# Patient Record
Sex: MALE | Race: OTHER | Employment: OTHER | URBAN - METROPOLITAN AREA
[De-identification: names, ages, dates, MRNs, and addresses within clinical notes are randomized per-mention and may not be internally consistent; named-entity substitution may affect disease eponyms.]

---

## 2022-08-16 ENCOUNTER — HOSPITAL ENCOUNTER (OUTPATIENT)
Dept: LAB | Age: 72
Discharge: HOME | End: 2022-08-16

## 2022-08-16 LAB — XX-LABCORP SPECIMEN COL,LCBCF: NORMAL

## 2022-08-16 PROCEDURE — 99001 SPECIMEN HANDLING PT-LAB: CPT

## 2022-09-21 ENCOUNTER — OFFICE VISIT (OUTPATIENT)
Dept: INTERNAL MEDICINE CLINIC | Age: 72
End: 2022-09-21
Payer: COMMERCIAL

## 2022-09-21 VITALS
TEMPERATURE: 97 F | BODY MASS INDEX: 26.09 KG/M2 | SYSTOLIC BLOOD PRESSURE: 106 MMHG | HEART RATE: 65 BPM | OXYGEN SATURATION: 98 % | DIASTOLIC BLOOD PRESSURE: 67 MMHG | RESPIRATION RATE: 18 BRPM | WEIGHT: 166.2 LBS | HEIGHT: 67 IN

## 2022-09-21 DIAGNOSIS — E11.69 TYPE 2 DIABETES MELLITUS WITH OTHER SPECIFIED COMPLICATION, WITH LONG-TERM CURRENT USE OF INSULIN (HCC): ICD-10-CM

## 2022-09-21 DIAGNOSIS — F22 PARANOIA (PSYCHOSIS) (HCC): ICD-10-CM

## 2022-09-21 DIAGNOSIS — Z76.89 ESTABLISHING CARE WITH NEW DOCTOR, ENCOUNTER FOR: Primary | ICD-10-CM

## 2022-09-21 DIAGNOSIS — E78.5 HYPERLIPIDEMIA, UNSPECIFIED HYPERLIPIDEMIA TYPE: ICD-10-CM

## 2022-09-21 DIAGNOSIS — I63.9 ISCHEMIC STROKE (HCC): ICD-10-CM

## 2022-09-21 DIAGNOSIS — I10 ESSENTIAL HYPERTENSION: ICD-10-CM

## 2022-09-21 DIAGNOSIS — Z79.4 TYPE 2 DIABETES MELLITUS WITH OTHER SPECIFIED COMPLICATION, WITH LONG-TERM CURRENT USE OF INSULIN (HCC): ICD-10-CM

## 2022-09-21 PROBLEM — E78.00 HYPERCHOLESTEREMIA: Status: ACTIVE | Noted: 2022-09-21

## 2022-09-21 PROBLEM — Z87.19 HISTORY OF GI BLEED: Status: ACTIVE | Noted: 2022-09-21

## 2022-09-21 PROBLEM — H26.9 CATARACTS, BOTH EYES: Status: ACTIVE | Noted: 2022-09-21

## 2022-09-21 PROBLEM — N20.0 KIDNEY STONES: Status: ACTIVE | Noted: 2022-09-21

## 2022-09-21 PROCEDURE — 99204 OFFICE O/P NEW MOD 45 MIN: CPT | Performed by: INTERNAL MEDICINE

## 2022-09-21 PROCEDURE — 1123F ACP DISCUSS/DSCN MKR DOCD: CPT | Performed by: INTERNAL MEDICINE

## 2022-09-21 RX ORDER — AMLODIPINE BESYLATE 5 MG/1
5 TABLET ORAL DAILY
Qty: 30 TABLET | Refills: 2 | Status: SHIPPED | OUTPATIENT
Start: 2022-09-21

## 2022-09-21 RX ORDER — RISPERIDONE 1 MG/1
TABLET, FILM COATED ORAL DAILY
COMMUNITY

## 2022-09-21 RX ORDER — ATORVASTATIN CALCIUM 20 MG/1
20 TABLET, FILM COATED ORAL DAILY
Qty: 30 TABLET | Refills: 2 | Status: SHIPPED | OUTPATIENT
Start: 2022-09-21

## 2022-09-21 RX ORDER — INSULIN GLARGINE 100 [IU]/ML
INJECTION, SOLUTION SUBCUTANEOUS
COMMUNITY
Start: 2022-09-19

## 2022-09-21 RX ORDER — ATORVASTATIN CALCIUM 10 MG/1
TABLET, FILM COATED ORAL DAILY
COMMUNITY
End: 2022-09-21 | Stop reason: SDUPTHER

## 2022-09-21 RX ORDER — AMLODIPINE BESYLATE 5 MG/1
5 TABLET ORAL DAILY
COMMUNITY
Start: 2022-09-19 | End: 2022-09-21 | Stop reason: SDUPTHER

## 2022-09-21 RX ORDER — METFORMIN HYDROCHLORIDE 500 MG/1
TABLET ORAL
COMMUNITY
Start: 2022-09-19

## 2022-09-21 NOTE — PROGRESS NOTES
HISTORY OF PRESENT ILLNESS  Matias Rowley is a 71 y.o. male. Patient comes to establish PCP. Patient comes in company of daughter who is an RN. Has no major complaints this morning. He will need refills for medications. Ischemic stroke  On atorvastatin. Pressure under control. Diabetes mellitus type 2  On metformin, Lantus, Januvia. Patient does not require refills for now. Hypertension -controlled  On amlodipine, well controlled. Hyperlipidemia  On atorvastatin. PAST MEDICAL HISTORY  Medical: Stroke 2018, diabetes type 2, hypertension, hyperlipidemia. Surgical: GI bleeding while patient was on aspirin status post clip. Allergy: denied. Medication: denied. Work: retired. Sexual: , 4 children. Social: mother breast cancer, father with liver disease, siblings with breast cancer. Establish Care  Pertinent negatives include no chest pain. Hypertension   Pertinent negatives include no chest pain and no palpitations. Diabetes  Pertinent negatives include no chest pain. Cholesterol Problem  Pertinent negatives include no chest pain. Review of Systems   Cardiovascular:  Negative for chest pain, palpitations and leg swelling. Visit Vitals  /67 (BP 1 Location: Right arm, BP Patient Position: Sitting, BP Cuff Size: Adult)   Pulse 65   Temp 97 °F (36.1 °C) (Temporal)   Resp 18   Ht 5' 7\" (1.702 m)   Wt 166 lb 3.2 oz (75.4 kg)   SpO2 98%   BMI 26.03 kg/m²     Physical Exam  Constitutional:       Appearance: Normal appearance. HENT:      Mouth/Throat:      Mouth: Mucous membranes are moist.      Pharynx: Oropharynx is clear. Eyes:      Extraocular Movements: Extraocular movements intact. Conjunctiva/sclera: Conjunctivae normal.      Pupils: Pupils are equal, round, and reactive to light. Cardiovascular:      Rate and Rhythm: Normal rate and regular rhythm. Pulses: Normal pulses. Heart sounds: Normal heart sounds.    Pulmonary:      Effort: Pulmonary effort is normal.      Breath sounds: Normal breath sounds. Abdominal:      General: Bowel sounds are normal.      Palpations: Abdomen is soft. Musculoskeletal:      Cervical back: Normal range of motion. Lymphadenopathy:      Cervical: No cervical adenopathy. Skin:     General: Skin is warm. Neurological:      Mental Status: He is alert and oriented to person, place, and time. Psychiatric:         Mood and Affect: Mood normal.       ASSESSMENT and PLAN    ICD-10-CM ICD-9-CM    1. Establishing care with new doctor, encounter for  Z76.89 V65.8       2. Essential hypertension  I10 401.9 amLODIPine (NORVASC) 5 mg tablet      METABOLIC PANEL, COMPREHENSIVE      METABOLIC PANEL, COMPREHENSIVE      3. Ischemic stroke ([facility])  I63.9 434.91 atorvastatin (LIPITOR) 20 mg tablet      LIPID PANEL      LIPID PANEL      4. Paranoia (psychosis) ([facility])  F22 297.1 REFERRAL TO PSYCHIATRY      5. Hyperlipidemia, unspecified hyperlipidemia type  E78.5 272.4 atorvastatin (LIPITOR) 20 mg tablet      LIPID PANEL      LIPID PANEL      6. Type 2 diabetes mellitus with other specified complication, with long-term current use of insulin ([facility])  E11.69 250.80 HEMOGLOBIN A1C WITH EAG    Z79.4 V58.67 HEMOGLOBIN A1C WITH EAG        Follow-up and Dispositions    Return for FU for . Robert Anthony Patient comes to establish PCP. Patient's daughter is concerned about the necessity of continue risperidone for paranoia which was started in 2020 after events of paranoia after COVID infection and be admitted to the ICU. Apparently he was started on risperidone while hospitalized. Referral to psychiatry. No new neurological deficits, patient does not have evidence neurological focalization on exam.  Atorvastatin dose has been increased from 10 mg to 20 mg. Follow-up in 3 months for health maintenance.

## 2022-09-21 NOTE — PROGRESS NOTES
ROOM # 10  Identified pt with two pt identifiers(name and ). Reviewed record in preparation for visit and have obtained necessary documentation. Chief Complaint   Patient presents with    Establish Care     Saw NP at [facility]. Hypertension    Diabetes    Cholesterol Problem      Dexter Bennett preferred language for health care discussion is English/Ukrainian. Is the patient using any DME equipment during OV? NO    Dexter Bennett is due for:  Health Maintenance Due   Topic    Hepatitis C Screening     Depression Screen     COVID-19 Vaccine (1)    Lipid Screen     DTaP/Tdap/Td series (1 - Tdap)    Colorectal Cancer Screening Combo     Shingrix Vaccine Age 50> (1 of 2)    Pneumococcal 65+ years (1 - PCV)    Flu Vaccine (1)     Health Maintenance reviewed and discussed per provider  Please order/place referral if appropriate. 1. For patients aged 39-70: Has the patient had a colonoscopy? No     Advance Directive:  1. Do you have an advance directive in place? Patient Reply: Not asked    2. If not, would you like material regarding how to put one in place? Not asked    Coordination of Care:  1. Have you been to the ER, urgent care clinic since your last visit? Hospitalized since your last visit? Yes-for medication refill of insulin    2. Have you seen or consulted any other health care providers outside of the [address] [place] [address] since your last visit? Include any pap smears or colon screening. NO    Patient is accompanied by daughter I have received verbal consent from Dexter Bennett to discuss any/all medical information while they are present in the room. Learning Assessment:  No flowsheet data found. Depression Screening:  3 most recent PHQ Screens 2022   Little interest or pleasure in doing things Not at all Not at all   Feeling down, depressed, irritable, or hopeless Not at all Not at all   Total Score PHQ 2 0 0     Abuse Screening:  No flowsheet data found.   Fall Risk  Fall Risk Assessment, last 12 mths 9/21/2022   Able to walk? Yes   Fall in past 12 months? 1   Do you feel unsteady? 0   Are you worried about falling 0   Number of falls in past 12 months 1   Fall with injury?  0     Recent Travel Screening and Travel History documentation     Travel Screening     No screening recorded since 09/20/22 0000       Travel History   Travel since 08/21/22    No documented travel since 08/21/22

## 2022-09-22 DIAGNOSIS — Z79.4 TYPE 2 DIABETES MELLITUS WITH OTHER SPECIFIED COMPLICATION, WITH LONG-TERM CURRENT USE OF INSULIN (HCC): Primary | ICD-10-CM

## 2022-09-22 DIAGNOSIS — E11.69 TYPE 2 DIABETES MELLITUS WITH OTHER SPECIFIED COMPLICATION, WITH LONG-TERM CURRENT USE OF INSULIN (HCC): Primary | ICD-10-CM

## 2022-09-22 LAB
ALBUMIN SERPL-MCNC: 4.4 G/DL (ref 3.7–4.7)
ALBUMIN/GLOB SERPL: 1.7 {RATIO} (ref 1.2–2.2)
ALP SERPL-CCNC: 96 IU/L (ref 44–121)
ALT SERPL-CCNC: 18 IU/L (ref 0–44)
AST SERPL-CCNC: 13 IU/L (ref 0–40)
BILIRUB SERPL-MCNC: 0.3 MG/DL (ref 0–1.2)
BUN SERPL-MCNC: 26 MG/DL (ref 8–27)
BUN/CREAT SERPL: 21 (ref 10–24)
CALCIUM SERPL-MCNC: 10 MG/DL (ref 8.6–10.2)
CHLORIDE SERPL-SCNC: 100 MMOL/L (ref 96–106)
CHOLEST SERPL-MCNC: 180 MG/DL (ref 100–199)
CO2 SERPL-SCNC: 21 MMOL/L (ref 20–29)
CREAT SERPL-MCNC: 1.21 MG/DL (ref 0.76–1.27)
EGFR: 64 ML/MIN/1.73
EST. AVERAGE GLUCOSE BLD GHB EST-MCNC: 237 MG/DL
GLOBULIN SER CALC-MCNC: 2.6 G/DL (ref 1.5–4.5)
GLUCOSE SERPL-MCNC: 229 MG/DL (ref 65–99)
HBA1C MFR BLD: 9.9 % (ref 4.8–5.6)
HDLC SERPL-MCNC: 37 MG/DL
IMP & REVIEW OF LAB RESULTS: NORMAL
LDLC SERPL CALC-MCNC: 101 MG/DL (ref 0–99)
POTASSIUM SERPL-SCNC: 4.5 MMOL/L (ref 3.5–5.2)
PROT SERPL-MCNC: 7 G/DL (ref 6–8.5)
SODIUM SERPL-SCNC: 138 MMOL/L (ref 134–144)
TRIGL SERPL-MCNC: 247 MG/DL (ref 0–149)
VLDLC SERPL CALC-MCNC: 42 MG/DL (ref 5–40)

## 2022-09-22 RX ORDER — MAGNESIUM SULFATE 100 %
15 CRYSTALS MISCELLANEOUS AS NEEDED
Qty: 15 TABLET | Refills: 1 | Status: SHIPPED | OUTPATIENT
Start: 2022-09-22

## 2022-09-22 NOTE — PROGRESS NOTES
Patient was called, patient and daughter were informed about blood work, insulin required increase from 35 units to 40 units nightly. Patient was not using Januvia or metformin for about a week. Now he has refills. Daughter will come in a week to evaluate glucose readings 3 times a day before meals and we will readjust accordingly. We will check A1c in 3 months.

## 2022-09-28 DIAGNOSIS — E11.69 TYPE 2 DIABETES MELLITUS WITH OTHER SPECIFIED COMPLICATION, WITH LONG-TERM CURRENT USE OF INSULIN (HCC): Primary | ICD-10-CM

## 2022-09-28 DIAGNOSIS — Z79.4 TYPE 2 DIABETES MELLITUS WITH OTHER SPECIFIED COMPLICATION, WITH LONG-TERM CURRENT USE OF INSULIN (HCC): Primary | ICD-10-CM

## 2022-09-28 RX ORDER — PEN NEEDLE, DIABETIC 31 GX3/16"
NEEDLE, DISPOSABLE MISCELLANEOUS
Qty: 100 PEN NEEDLE | Refills: 1 | Status: SHIPPED | OUTPATIENT
Start: 2022-09-28

## 2022-10-17 ENCOUNTER — TELEPHONE (OUTPATIENT)
Dept: INTERNAL MEDICINE CLINIC | Age: 72
End: 2022-10-17

## 2022-10-17 ENCOUNTER — DOCUMENTATION ONLY (OUTPATIENT)
Dept: INTERNAL MEDICINE CLINIC | Age: 72
End: 2022-10-17

## 2022-10-17 NOTE — TELEPHONE ENCOUNTER
Noted that patient has been seen message in where I answered to their concerns. Says that he has been having hallucinations, and he is out of risperidone and per patient's daughter since that risperidone was not working. I would like to place an appointment with behavioral health clinic so we can address further management. I also asked her to schedule a video appointment with me and left a message with instructions. Hopefully they will reach back some. No

## 2022-12-19 ENCOUNTER — OFFICE VISIT (OUTPATIENT)
Dept: INTERNAL MEDICINE CLINIC | Age: 72
End: 2022-12-19
Payer: COMMERCIAL

## 2022-12-19 VITALS
OXYGEN SATURATION: 100 % | TEMPERATURE: 96.8 F | HEART RATE: 74 BPM | BODY MASS INDEX: 26.71 KG/M2 | HEIGHT: 67 IN | RESPIRATION RATE: 16 BRPM | DIASTOLIC BLOOD PRESSURE: 78 MMHG | WEIGHT: 170.2 LBS | SYSTOLIC BLOOD PRESSURE: 110 MMHG

## 2022-12-19 DIAGNOSIS — E78.5 HYPERLIPIDEMIA, UNSPECIFIED HYPERLIPIDEMIA TYPE: ICD-10-CM

## 2022-12-19 DIAGNOSIS — E11.69 TYPE 2 DIABETES MELLITUS WITH OTHER SPECIFIED COMPLICATION, WITH LONG-TERM CURRENT USE OF INSULIN (HCC): ICD-10-CM

## 2022-12-19 DIAGNOSIS — Z12.11 COLON CANCER SCREENING: ICD-10-CM

## 2022-12-19 DIAGNOSIS — G47.00 INSOMNIA, UNSPECIFIED TYPE: ICD-10-CM

## 2022-12-19 DIAGNOSIS — Z23 NEEDS FLU SHOT: ICD-10-CM

## 2022-12-19 DIAGNOSIS — Z23 ENCOUNTER FOR IMMUNIZATION: ICD-10-CM

## 2022-12-19 DIAGNOSIS — I63.9 ISCHEMIC STROKE (HCC): ICD-10-CM

## 2022-12-19 DIAGNOSIS — Z11.59 ENCOUNTER FOR HEPATITIS C SCREENING TEST FOR LOW RISK PATIENT: ICD-10-CM

## 2022-12-19 DIAGNOSIS — F03.911 AGITATION DUE TO DEMENTIA: Primary | ICD-10-CM

## 2022-12-19 DIAGNOSIS — I10 ESSENTIAL HYPERTENSION: ICD-10-CM

## 2022-12-19 DIAGNOSIS — Z79.4 TYPE 2 DIABETES MELLITUS WITH OTHER SPECIFIED COMPLICATION, WITH LONG-TERM CURRENT USE OF INSULIN (HCC): ICD-10-CM

## 2022-12-19 PROCEDURE — 3074F SYST BP LT 130 MM HG: CPT | Performed by: INTERNAL MEDICINE

## 2022-12-19 PROCEDURE — 99214 OFFICE O/P EST MOD 30 MIN: CPT | Performed by: INTERNAL MEDICINE

## 2022-12-19 PROCEDURE — 90677 PCV20 VACCINE IM: CPT | Performed by: INTERNAL MEDICINE

## 2022-12-19 PROCEDURE — 3046F HEMOGLOBIN A1C LEVEL >9.0%: CPT | Performed by: INTERNAL MEDICINE

## 2022-12-19 PROCEDURE — 1123F ACP DISCUSS/DSCN MKR DOCD: CPT | Performed by: INTERNAL MEDICINE

## 2022-12-19 PROCEDURE — 3078F DIAST BP <80 MM HG: CPT | Performed by: INTERNAL MEDICINE

## 2022-12-19 PROCEDURE — 90694 VACC AIIV4 NO PRSRV 0.5ML IM: CPT | Performed by: INTERNAL MEDICINE

## 2022-12-19 RX ORDER — AMLODIPINE BESYLATE 5 MG/1
5 TABLET ORAL DAILY
Qty: 90 TABLET | Refills: 1 | Status: SHIPPED | OUTPATIENT
Start: 2022-12-19

## 2022-12-19 RX ORDER — PEN NEEDLE, DIABETIC 31 GX3/16"
NEEDLE, DISPOSABLE MISCELLANEOUS
Qty: 100 PEN NEEDLE | Refills: 5 | Status: SHIPPED | OUTPATIENT
Start: 2022-12-19

## 2022-12-19 RX ORDER — METFORMIN HYDROCHLORIDE 1000 MG/1
1000 TABLET ORAL DAILY
Qty: 90 TABLET | Refills: 1 | Status: SHIPPED | OUTPATIENT
Start: 2022-12-19

## 2022-12-19 RX ORDER — TRAZODONE HYDROCHLORIDE 50 MG/1
25 TABLET ORAL
Qty: 30 TABLET | Refills: 2 | Status: SHIPPED | OUTPATIENT
Start: 2022-12-19

## 2022-12-19 RX ORDER — ATORVASTATIN CALCIUM 40 MG/1
40 TABLET, FILM COATED ORAL DAILY
Qty: 90 TABLET | Refills: 1 | Status: SHIPPED | OUTPATIENT
Start: 2022-12-19

## 2022-12-19 RX ORDER — INSULIN GLARGINE 100 [IU]/ML
35 INJECTION, SOLUTION SUBCUTANEOUS
Qty: 5 PEN | Refills: 5 | Status: SHIPPED | OUTPATIENT
Start: 2022-12-19

## 2022-12-19 NOTE — PROGRESS NOTES
HISTORY OF PRESENT ILLNESS  Asha Barrett is a 71 y.o. male. Follow-up    Patient is coming in company of her daughter and his wife. Her daughter is telling me that he had appointment with psychologist/psychiatrist.  In the first appointment he was told to stop risperidone. In the second appointment he was asked why he was not taking it. Because of this discrepancies with a provider, patient and patient's daughter decided not to continue with them. Patient has been off risperidone for the last over 3 months without any complain. He is agitation has improved on its own. He has still has insomnia is seen up until 3 AM, and sometimes he may present some agitation and nighttime only. Patient's daughters wondering if there is something we can use to control agitation in this case with underlying dementia and that would also help on his sleeping. Also noted that in his first visit with me his A1c was very high over 9. They tell me they ran out of insulin for the last 1 or 2 weeks. Asking for refills. Denies fever chills sweats chest pain shortness of breath or any other concern. Denied GI symptoms as well. Ischemic stroke 2018  On atorvastatin increased to 40 mg daily. In the beginning he was taking aspirin but it was stopped because of GI bleeding. Pressure under control.     Diabetes mellitus type 2  On metformin increased to 1000 mg daily, Lantus increased to 35 units daily, Januvia. All refilled.     Hypertension -controlled  On amlodipine, well controlled.     Hyperlipidemia  On atorvastatin increased to 40 mg daily. Underlying dementia/insomnia/nighttime agitation  At some point he was taking risperidone but ran out of medication over 3 months ago and did not feel comfortable following up with psychiatrist/psychologist.  Being off risperidone patient is a patient has been controlled for the most part.   We will start trazodone lowest dose nightly to help with insomnia and nighttime agitation in underlying dementia. Latent TB  Patient is on treatment by another provider with rifampin. Review of Systems   Constitutional:  Negative for chills and fever. HENT:  Negative for congestion. Respiratory:  Negative for cough and shortness of breath. Cardiovascular:  Negative for chest pain, palpitations and leg swelling. Visit Vitals  /78 (BP 1 Location: Right arm)   Pulse 74   Temp 96.8 °F (36 °C) (Temporal)   Resp 16   Ht 5' 7\" (1.702 m)   Wt 170 lb 3.2 oz (77.2 kg)   SpO2 100%   BMI 26.66 kg/m²     Physical Exam  Constitutional:       Appearance: Normal appearance. HENT:      Mouth/Throat:      Mouth: Mucous membranes are moist.      Pharynx: Oropharynx is clear. Eyes:      Extraocular Movements: Extraocular movements intact. Conjunctiva/sclera: Conjunctivae normal.      Pupils: Pupils are equal, round, and reactive to light. Cardiovascular:      Rate and Rhythm: Normal rate and regular rhythm. Pulses: Normal pulses. Heart sounds: Normal heart sounds. Pulmonary:      Effort: Pulmonary effort is normal.      Breath sounds: Normal breath sounds. Abdominal:      General: Bowel sounds are normal.      Palpations: Abdomen is soft. Musculoskeletal:      Cervical back: Normal range of motion. Lymphadenopathy:      Cervical: No cervical adenopathy. Skin:     General: Skin is warm. Neurological:      Mental Status: He is alert and oriented to person, place, and time. Psychiatric:         Mood and Affect: Mood normal.       ASSESSMENT and PLAN    ICD-10-CM ICD-9-CM    1. Agitation due to dementia  F03.911 294.21 traZODone (DESYREL) 50 mg tablet      2. Insomnia, unspecified type  G47.00 780.52 traZODone (DESYREL) 50 mg tablet      3. Encounter for hepatitis C screening test for low risk patient  Z11.59 V73.89 HEPATITIS C AB      4.  Type 2 diabetes mellitus with other specified complication, with long-term current use of insulin (HCC)  E11.69 250.80 Insulin Needles, Disposable, 32 gauge x 5/32\" ndle    Z79.4 V58.67 Lantus Solostar U-100 Insulin 100 unit/mL (3 mL) inpn      metFORMIN (GLUCOPHAGE) 1,000 mg tablet      SITagliptin phosphate (JANUVIA) 25 mg tablet      METABOLIC PANEL, COMPREHENSIVE      MICROALBUMIN, UR, RAND W/ MICROALB/CREAT RATIO      REFERRAL TO OPHTHALMOLOGY      5. Essential hypertension  I10 401.9 amLODIPine (NORVASC) 5 mg tablet      METABOLIC PANEL, COMPREHENSIVE      6. Hyperlipidemia, unspecified hyperlipidemia type  E78.5 272.4 atorvastatin (LIPITOR) 40 mg tablet      LIPID PANEL      7. Ischemic stroke (HCC)  I63.9 434.91 atorvastatin (LIPITOR) 40 mg tablet      8. Needs flu shot  Z23 V04.81 INFLUENZA, FLUZONE HIGH-DOSE, (AGE 65 Y+), IM, PF, 0.7 ML      9. Encounter for immunization  Z23 V03.89 PNEUMOCOCCAL, PCV20, PREVNAR 20, (AGE 18 YRS+), IM, PF      10. Colon cancer screening  Z12.11 V76.51 REFERRAL TO GASTROENTEROLOGY        Follow-up and Dispositions    Return in about 3 months (around 3/19/2023) for 3 month for virtual, sugar HTN. 6 months in person. .     Metformin increased to 1000 mg daily. Lantus increased to 45 units daily. Atorvastatin increased to 40 mg daily. Patient still of aspirin due to history of GI bleeding. Ordering trazodone for insomnia/agitation related to dementia. Starting low-dose dose. Follow-up in 3 months in 6 months.

## 2022-12-20 LAB
ALBUMIN SERPL-MCNC: 4.9 G/DL (ref 3.7–4.7)
ALBUMIN/CREAT UR: 18 MG/G CREAT (ref 0–29)
ALBUMIN/GLOB SERPL: 2 {RATIO} (ref 1.2–2.2)
ALP SERPL-CCNC: 100 IU/L (ref 44–121)
ALT SERPL-CCNC: 17 IU/L (ref 0–44)
AST SERPL-CCNC: 13 IU/L (ref 0–40)
BILIRUB SERPL-MCNC: 0.2 MG/DL (ref 0–1.2)
BUN SERPL-MCNC: 31 MG/DL (ref 8–27)
BUN/CREAT SERPL: 26 (ref 10–24)
CALCIUM SERPL-MCNC: 10.2 MG/DL (ref 8.6–10.2)
CHLORIDE SERPL-SCNC: 97 MMOL/L (ref 96–106)
CHOLEST SERPL-MCNC: 159 MG/DL (ref 100–199)
CO2 SERPL-SCNC: 22 MMOL/L (ref 20–29)
CREAT SERPL-MCNC: 1.21 MG/DL (ref 0.76–1.27)
CREAT UR-MCNC: 71 MG/DL
EGFR: 64 ML/MIN/1.73
GLOBULIN SER CALC-MCNC: 2.4 G/DL (ref 1.5–4.5)
GLUCOSE SERPL-MCNC: 331 MG/DL (ref 70–99)
HCV AB S/CO SERPL IA: <0.1 S/CO RATIO (ref 0–0.9)
HDLC SERPL-MCNC: 40 MG/DL
IMP & REVIEW OF LAB RESULTS: NORMAL
LDLC SERPL CALC-MCNC: 69 MG/DL (ref 0–99)
MICROALBUMIN UR-MCNC: 12.8 UG/ML
POTASSIUM SERPL-SCNC: 4.8 MMOL/L (ref 3.5–5.2)
PROT SERPL-MCNC: 7.3 G/DL (ref 6–8.5)
SODIUM SERPL-SCNC: 135 MMOL/L (ref 134–144)
TRIGL SERPL-MCNC: 318 MG/DL (ref 0–149)
VLDLC SERPL CALC-MCNC: 50 MG/DL (ref 5–40)

## 2022-12-21 NOTE — PROGRESS NOTES
Kidney function has not been affected by diabetes yet. Glucose elevated as expected, hopefully with the new changes in treatment she is going to get better. Continue checking glucose before meals 3 times a day and write down the paper, I will need the numbers in the next appointment. Triglyceride was elevated which can be from eating before exam but LDL HDL and total cholesterol good.

## 2023-01-04 NOTE — PROGRESS NOTES
Reached out to Pt regarding lab results. Left a vm as he did not answer. Requested a call back with [facility] phone number.

## 2023-02-05 ENCOUNTER — PATIENT MESSAGE (OUTPATIENT)
Dept: INTERNAL MEDICINE CLINIC | Age: 73
End: 2023-02-05

## 2023-02-06 DIAGNOSIS — F03.911 AGITATION DUE TO DEMENTIA: Primary | ICD-10-CM

## 2023-02-06 RX ORDER — OLANZAPINE 2.5 MG/1
2.5 TABLET ORAL
Qty: 60 TABLET | Refills: 1 | Status: SHIPPED | OUTPATIENT
Start: 2023-02-06

## 2023-02-06 NOTE — TELEPHONE ENCOUNTER
Pts daughter Pierre is returning Dr. Aleida Emerson phone call about pts referral and also discuss pervious medications. Pt does not have a PHI on file with the daughter on it.

## 2023-02-07 ENCOUNTER — DOCUMENTATION ONLY (OUTPATIENT)
Dept: INTERNAL MEDICINE CLINIC | Age: 73
End: 2023-02-07

## 2023-02-07 NOTE — PROGRESS NOTES
Patient and patient's wife moved to [place]. Patient's daughter provided me with address of the new psychiatrist [address] [place] [address]., [place]. [phone], [phone] [facility]. I will go ahead and send a referral to psychiatric disease address. I will continue to be their PCP until day established with a new provider in the new area.

## 2023-02-08 DIAGNOSIS — Z79.4 TYPE 2 DIABETES MELLITUS WITH OTHER SPECIFIED COMPLICATION, WITH LONG-TERM CURRENT USE OF INSULIN (HCC): Primary | ICD-10-CM

## 2023-02-08 DIAGNOSIS — E11.69 TYPE 2 DIABETES MELLITUS WITH OTHER SPECIFIED COMPLICATION, WITH LONG-TERM CURRENT USE OF INSULIN (HCC): Primary | ICD-10-CM

## 2023-02-08 RX ORDER — BLOOD-GLUCOSE SENSOR
EACH MISCELLANEOUS
Qty: 10 EACH | Refills: 1 | Status: SHIPPED | OUTPATIENT
Start: 2023-02-08

## 2023-02-08 RX ORDER — BLOOD-GLUCOSE,RECEIVER,CONT
EACH MISCELLANEOUS
Qty: 1 EACH | Refills: 0 | Status: SHIPPED | OUTPATIENT
Start: 2023-02-08

## 2023-02-28 ENCOUNTER — TELEMEDICINE (OUTPATIENT)
Facility: CLINIC | Age: 73
End: 2023-02-28
Payer: COMMERCIAL

## 2023-02-28 DIAGNOSIS — E78.5 HYPERLIPIDEMIA, UNSPECIFIED HYPERLIPIDEMIA TYPE: ICD-10-CM

## 2023-02-28 DIAGNOSIS — Z79.4 TYPE 2 DIABETES MELLITUS WITH OTHER SPECIFIED COMPLICATION, WITH LONG-TERM CURRENT USE OF INSULIN (HCC): Primary | ICD-10-CM

## 2023-02-28 DIAGNOSIS — E11.69 TYPE 2 DIABETES MELLITUS WITH OTHER SPECIFIED COMPLICATION, WITH LONG-TERM CURRENT USE OF INSULIN (HCC): Primary | ICD-10-CM

## 2023-02-28 DIAGNOSIS — I10 ESSENTIAL HYPERTENSION: ICD-10-CM

## 2023-02-28 PROCEDURE — 1123F ACP DISCUSS/DSCN MKR DOCD: CPT | Performed by: INTERNAL MEDICINE

## 2023-02-28 PROCEDURE — 99214 OFFICE O/P EST MOD 30 MIN: CPT | Performed by: INTERNAL MEDICINE

## 2023-02-28 RX ORDER — AMLODIPINE BESYLATE 5 MG/1
5 TABLET ORAL DAILY
Qty: 90 TABLET | Refills: 2 | Status: SHIPPED | OUTPATIENT
Start: 2023-02-28

## 2023-02-28 RX ORDER — PEN NEEDLE, DIABETIC 31 GX5/16"
NEEDLE, DISPOSABLE MISCELLANEOUS
Qty: 100 EACH | Refills: 5 | Status: SHIPPED | OUTPATIENT
Start: 2023-02-28

## 2023-02-28 RX ORDER — ATORVASTATIN CALCIUM 40 MG/1
40 TABLET, FILM COATED ORAL DAILY
Qty: 90 TABLET | Refills: 2 | Status: SHIPPED | OUTPATIENT
Start: 2023-02-28

## 2023-02-28 RX ORDER — LANCETS 30 GAUGE
EACH MISCELLANEOUS
Qty: 300 EACH | Refills: 3 | Status: SHIPPED | OUTPATIENT
Start: 2023-02-28

## 2023-02-28 RX ORDER — GLUCOSAMINE HCL/CHONDROITIN SU 500-400 MG
CAPSULE ORAL
Qty: 100 STRIP | Refills: 11 | Status: SHIPPED | OUTPATIENT
Start: 2023-02-28

## 2023-02-28 RX ORDER — INSULIN GLARGINE 100 [IU]/ML
35 INJECTION, SOLUTION SUBCUTANEOUS DAILY
Qty: 5 ADJUSTABLE DOSE PRE-FILLED PEN SYRINGE | Refills: 3 | Status: SHIPPED | OUTPATIENT
Start: 2023-02-28

## 2023-02-28 ASSESSMENT — ENCOUNTER SYMPTOMS
SHORTNESS OF BREATH: 0
CONSTIPATION: 0
COUGH: 0
DIARRHEA: 0
ABDOMINAL PAIN: 0

## 2023-02-28 NOTE — PROGRESS NOTES
Pascale Carcamo (:  1950) is a Established patient, here for evaluation of the following:    Assessment & Plan   Below is the assessment and plan developed based on review of pertinent history, physical exam, labs, studies, and medications. 1. Type 2 diabetes mellitus with other specified complication, with long-term current use of insulin (HCC)  -     insulin glargine (LANTUS SOLOSTAR) 100 UNIT/ML injection pen; Inject 35 Units into the skin daily, Disp-5 Adjustable Dose Pre-filled Pen Syringe, R-3Normal  -     metFORMIN (GLUCOPHAGE) 1000 MG tablet; Take 1 tablet by mouth daily, Disp-180 tablet, R-3Normal  -     SITagliptin (JANUVIA) 25 MG tablet; Take 1 tablet by mouth daily, Disp-90 tablet, R-3Normal  2. Hyperlipidemia, unspecified hyperlipidemia type  -     atorvastatin (LIPITOR) 40 MG tablet; Take 1 tablet by mouth daily, Disp-90 tablet, R-2Normal  3. Essential hypertension  -     amLODIPine (NORVASC) 5 MG tablet; Take 1 tablet by mouth daily, Disp-90 tablet, R-2Normal  No follow-ups on file. Today evaluating for HTN check. /70, HR 70. Reported by her wife. He had visit with psychiatrist, and he agreed with cont treatment with Zyprexa for insomnia and agitation. Per psychiatrist notes: \"His PCP prescribed trazodone which was causing fatigue and stopped. He recently received Zyprexa 2.5 mg and tried only 1 dose as his daughter is scared to give him antipsychotic. \", \"Suggested to continue Zyprexa 2.5 mg prescribed by PCP for mood, racing of thoughts, anxiety and agitation at nighttime. Patient agrees with safety plan which includes calling [facility] or [phone] or go to ER if needed and to follow up with treatment recommendations. \"  Patient and wife asking for refills. I agreed to provide him refills for the next 9 months, they are looking for new PCP in the new area where they live now. I told them to contact me if for some reason they run out of medication.  Regarding the psychotropic medications, now those medications are managed by psychiatrist. Patient will ask for refill of those to the psychiatrist.  Patient denied fever, chills, sweats, chest pain, or SOB. No other concerns or complaints in this evaluation. They are both grateful for my service and they will reach back to me if they need anything in the meanwhile they find a new PCP. Ischemic stroke 2018  On atorvastatin increased to 40 mg daily. In the beginning he was taking aspirin but it was stopped because of GI bleeding. Pressure under control. Diabetes mellitus type 2  On metformin increased to 1000 mg daily, Lantus increased to 35 units daily, Januvia. All refilled. Hypertension -controlled  On amlodipine, well controlled. Hyperlipidemia  On atorvastatin increased to 40 mg daily. Underlying dementia/insomnia/nighttime agitation  We will start trazodone lowest dose nightly to help with insomnia and nighttime agitation in underlying dementia. Not taking risperidone. Latent TB  Patient is on treatment by another provider with rifampin. Current taking treatment. Subjective   HPI  Review of Systems   Constitutional:  Negative for chills and fever. Respiratory:  Negative for cough and shortness of breath. Cardiovascular:  Negative for chest pain. Gastrointestinal:  Negative for abdominal pain, constipation and diarrhea. Objective   Patient-Reported Vitals  No data recorded     Physical Exam  Constitutional:       Appearance: Normal appearance. HENT:      Mouth/Throat:      Mouth: Mucous membranes are moist.   Eyes:      Pupils: Pupils are equal, round, and reactive to light. Cardiovascular:      Rate and Rhythm: Normal rate and regular rhythm. Pulmonary:      Effort: Pulmonary effort is normal.      Breath sounds: Normal breath sounds. Abdominal:      General: Bowel sounds are normal.      Palpations: Abdomen is soft. Musculoskeletal:         General: Normal range of motion. Cervical back: Normal range of motion. Lymphadenopathy:      Cervical: No cervical adenopathy. Skin:     General: Skin is warm. Neurological:      General: No focal deficit present. Mental Status: He is alert. [INSTRUCTIONS:  \"[x]\" Indicates a positive item  \"[]\" Indicates a negative item  -- DELETE ALL ITEMS NOT EXAMINED]    Constitutional: [x] Appears well-developed and well-nourished [x] No apparent distress      [] Abnormal -     Mental status: [x] Alert and awake  [x] Oriented to person/place/time [x] Able to follow commands    [] Abnormal -     Eyes:   EOM    [x]  Normal    [] Abnormal -   Sclera  [x]  Normal    [] Abnormal -          Discharge [x]  None visible   [] Abnormal -     HENT: [x] Normocephalic, atraumatic  [] Abnormal -   [x] Mouth/Throat: Mucous membranes are moist    External Ears [x] Normal  [] Abnormal -    Neck: [x] No visualized mass [] Abnormal -     Pulmonary/Chest: [x] Respiratory effort normal   [x] No visualized signs of difficulty breathing or respiratory distress        [] Abnormal -      Musculoskeletal:   [x] Normal gait with no signs of ataxia         [x] Normal range of motion of neck        [] Abnormal -     Neurological:        [x] No Facial Asymmetry (Cranial nerve 7 motor function) (limited exam due to video visit)          [x] No gaze palsy        [] Abnormal -          Skin:        [x] No significant exanthematous lesions or discoloration noted on facial skin         [] Abnormal -            Psychiatric:       [x] Normal Affect [] Abnormal -        [x] No Hallucinations    Other pertinent observable physical exam findings: Briseida Lenz, was evaluated through a synchronous (real-time) audio-video encounter. The patient (or guardian if applicable) is aware that this is a billable service, which includes applicable co-pays. This Virtual Visit was conducted with patient's (and/or legal guardian's) consent.  The visit was conducted pursuant to the emergency declaration under the [address] [facility], [facility] authority and the [employer] and [employer] Act. Patient identification was verified, and a caregiver was present when appropriate.    The patient was located at Home: [address] [facility] [address],  [address]  Provider was located at [facility] ([address] [place], [place] [address]t): [facility]  [address] [facility],  [address] [facility]         --Aggie Alejandro MD

## 2023-02-28 NOTE — PROGRESS NOTES
1. \"Have you been to the ER, urgent care clinic since your last visit? Hospitalized since your last visit? \" No    2. \"Have you seen or consulted any other health care providers outside of the [address] [place] [address] since your last visit? \" Yes. Sees Psychiatrist     3. For patients aged 39-70: Has the patient had a colonoscopy / FIT/ Cologuard? No     If the patient is female:    4. For patients aged 41-77: Has the patient had a mammogram within the past 2 years? N/A    5. For patients aged 21-65: Has the patient had a pap smear?  N/A

## 2023-03-01 ENCOUNTER — TELEPHONE (OUTPATIENT)
Facility: CLINIC | Age: 73
End: 2023-03-01

## 2023-03-01 DIAGNOSIS — E11.69 TYPE 2 DIABETES MELLITUS WITH OTHER SPECIFIED COMPLICATION, WITH LONG-TERM CURRENT USE OF INSULIN (HCC): Primary | ICD-10-CM

## 2023-03-01 DIAGNOSIS — Z79.4 TYPE 2 DIABETES MELLITUS WITH OTHER SPECIFIED COMPLICATION, WITH LONG-TERM CURRENT USE OF INSULIN (HCC): Primary | ICD-10-CM

## 2023-03-01 RX ORDER — INSULIN DETEMIR 100 [IU]/ML
35 INJECTION, SOLUTION SUBCUTANEOUS DAILY
Qty: 5 ADJUSTABLE DOSE PRE-FILLED PEN SYRINGE | Refills: 3 | Status: SHIPPED | OUTPATIENT
Start: 2023-03-01

## 2023-03-01 NOTE — PROGRESS NOTES
Lantus is no longer covered by insurance, alternatives suggested to the pharmacy are Levemir Flex Pen, Levemir vial.  Ordered Levemir FlexPen 35 units applied in the skin daily. Refills provided.

## 2023-04-13 ENCOUNTER — TELEPHONE (OUTPATIENT)
Facility: CLINIC | Age: 73
End: 2023-04-13

## 2023-04-17 ENCOUNTER — TELEPHONE (OUTPATIENT)
Facility: CLINIC | Age: 73
End: 2023-04-17

## 2023-04-17 RX ORDER — INSULIN GLARGINE 100 [IU]/ML
35 INJECTION, SOLUTION SUBCUTANEOUS DAILY
Qty: 5 ADJUSTABLE DOSE PRE-FILLED PEN SYRINGE | Refills: 3 | Status: SHIPPED | OUTPATIENT
Start: 2023-04-17 | End: 2023-04-18 | Stop reason: ALTCHOICE

## 2023-04-17 NOTE — TELEPHONE ENCOUNTER
I explained Levemir was not approved, I will send Lantus same dosage as he was taking before. He needs a new PCP in his area. I asked to call me back if any questions. Evonne Damon [phone]. I called the patient and the patient's wife informed back no answer.

## 2023-04-18 DIAGNOSIS — E11.69 TYPE 2 DIABETES MELLITUS WITH OTHER SPECIFIED COMPLICATION, WITH LONG-TERM CURRENT USE OF INSULIN (HCC): Primary | ICD-10-CM

## 2023-04-18 DIAGNOSIS — Z79.4 TYPE 2 DIABETES MELLITUS WITH OTHER SPECIFIED COMPLICATION, WITH LONG-TERM CURRENT USE OF INSULIN (HCC): Primary | ICD-10-CM

## 2023-05-31 ENCOUNTER — TELEPHONE (OUTPATIENT)
Facility: CLINIC | Age: 73
End: 2023-05-31

## 2023-05-31 NOTE — TELEPHONE ENCOUNTER
Pharmacy sent an alternative request for Januvia 25mg tab.  Pharmacy notes:\" Alternative requested not covered, suggested alternative:Metformin HCL 500MG TAB,Metformin HCL 850MG TAB, Onglyza 2.5 mg tab, Metformin HCL 1,000MG Tab\"